# Patient Record
Sex: FEMALE | URBAN - METROPOLITAN AREA
[De-identification: names, ages, dates, MRNs, and addresses within clinical notes are randomized per-mention and may not be internally consistent; named-entity substitution may affect disease eponyms.]

---

## 2023-09-08 ENCOUNTER — APPOINTMENT (RX ONLY)
Dept: URBAN - METROPOLITAN AREA CLINIC 340 | Facility: CLINIC | Age: 78
Setting detail: DERMATOLOGY
End: 2023-09-08

## 2023-09-08 DIAGNOSIS — Z41.9 ENCOUNTER FOR PROCEDURE FOR PURPOSES OTHER THAN REMEDYING HEALTH STATE, UNSPECIFIED: ICD-10-CM

## 2023-09-08 PROCEDURE — ? HYDRAFACIAL

## 2023-09-08 ASSESSMENT — LOCATION SIMPLE DESCRIPTION DERM: LOCATION SIMPLE: LEFT CHEEK

## 2023-09-08 ASSESSMENT — LOCATION ZONE DERM: LOCATION ZONE: FACE

## 2023-09-08 ASSESSMENT — LOCATION DETAILED DESCRIPTION DERM: LOCATION DETAILED: LEFT MEDIAL MALAR CHEEK

## 2023-09-08 NOTE — PROCEDURE: HYDRAFACIAL
Solution Override
Solution: GlySal 15%
Procedure: Extraction
Tip: Hydropeel Tip, Teal
Indication: skin texture
Procedure: Fusion
Treatment Number: 1
Vacuum Pressure Low Setting (Will Not Render If Set To 0): 16
Tip: Hydropeel Tip, Clear
Number Of Passes: 0
Procedure: Peel
Tip: Hydropeel Tip, Blue
Procedure: Exfoliation
Solution Override: Perk Eye&Lip
Solution: Antiox-6
Location: face
Solution: Beta-HD
Tip Override
Solution: Activ-4
Consent: Written consent obtained, risks reviewed including but not limited to crusting, scabbing, blistering, scarring, darker or lighter pigmentary change, bruising, and/or incomplete response.
Post-Care Instructions: I reviewed with the patient in detail post-care instructions. Patient should stay away from the sun and wear sun protection until treated areas are fully healed.
Tip Override: Perk Eye Tip